# Patient Record
Sex: FEMALE | Race: WHITE | Employment: UNEMPLOYED | URBAN - METROPOLITAN AREA
[De-identification: names, ages, dates, MRNs, and addresses within clinical notes are randomized per-mention and may not be internally consistent; named-entity substitution may affect disease eponyms.]

---

## 2020-10-01 ENCOUNTER — TRANSCRIBE ORDER (OUTPATIENT)
Dept: SCHEDULING | Age: 35
End: 2020-10-01

## 2020-10-01 DIAGNOSIS — M25.552 LEFT HIP PAIN: Primary | ICD-10-CM

## 2020-10-09 ENCOUNTER — HOSPITAL ENCOUNTER (OUTPATIENT)
Dept: MRI IMAGING | Age: 35
Discharge: HOME | End: 2020-10-09
Attending: ORTHOPAEDIC SURGERY
Payer: OTHER GOVERNMENT

## 2020-10-09 ENCOUNTER — HOSPITAL ENCOUNTER (OUTPATIENT)
Dept: GENERAL RADIOLOGY | Age: 35
Discharge: HOME | End: 2020-10-09
Attending: ORTHOPAEDIC SURGERY
Payer: OTHER GOVERNMENT

## 2020-10-09 DIAGNOSIS — M25.552 LEFT HIP PAIN: ICD-10-CM

## 2020-10-09 PROCEDURE — 74011000636 HC RX REV CODE- 636: Performed by: RADIOLOGY

## 2020-10-09 PROCEDURE — A9575 INJ GADOTERATE MEGLUMI 0.1ML: HCPCS | Performed by: RADIOLOGY

## 2020-10-09 PROCEDURE — 27093 INJECTION FOR HIP X-RAY: CPT

## 2020-10-09 PROCEDURE — 2709999900 HC NON-CHARGEABLE SUPPLY

## 2020-10-09 PROCEDURE — 73722 MRI JOINT OF LWR EXTR W/DYE: CPT

## 2020-10-09 PROCEDURE — 74011250636 HC RX REV CODE- 250/636: Performed by: RADIOLOGY

## 2020-10-09 PROCEDURE — 74011000250 HC RX REV CODE- 250

## 2020-10-09 RX ORDER — LIDOCAINE HYDROCHLORIDE 10 MG/ML
10 INJECTION, SOLUTION EPIDURAL; INFILTRATION; INTRACAUDAL; PERINEURAL
Status: DISCONTINUED | OUTPATIENT
Start: 2020-10-09 | End: 2020-10-10 | Stop reason: HOSPADM

## 2020-10-09 RX ORDER — GADOTERATE MEGLUMINE 376.9 MG/ML
1 INJECTION INTRAVENOUS
Status: COMPLETED | OUTPATIENT
Start: 2020-10-09 | End: 2020-10-09

## 2020-10-09 RX ORDER — SODIUM BICARBONATE 42 MG/ML
INJECTION, SOLUTION INTRAVENOUS
Status: COMPLETED
Start: 2020-10-09 | End: 2020-10-09

## 2020-10-09 RX ORDER — SODIUM BICARBONATE 42 MG/ML
1 INJECTION, SOLUTION INTRAVENOUS
Status: DISCONTINUED | OUTPATIENT
Start: 2020-10-09 | End: 2020-10-10 | Stop reason: HOSPADM

## 2020-10-09 RX ORDER — LIDOCAINE HYDROCHLORIDE 10 MG/ML
INJECTION, SOLUTION EPIDURAL; INFILTRATION; INTRACAUDAL; PERINEURAL
Status: COMPLETED
Start: 2020-10-09 | End: 2020-10-09

## 2020-10-09 RX ADMIN — LIDOCAINE HYDROCHLORIDE 5 ML: 10 INJECTION, SOLUTION EPIDURAL; INFILTRATION; INTRACAUDAL; PERINEURAL at 14:38

## 2020-10-09 RX ADMIN — IOHEXOL 10 ML: 300 INJECTION, SOLUTION INTRAVENOUS at 14:38

## 2020-10-09 RX ADMIN — GADOTERATE MEGLUMINE 10 ML: 376.9 INJECTION INTRAVENOUS at 14:37

## 2020-10-09 RX ADMIN — SODIUM BICARBONATE 210 MG: 42 INJECTION, SOLUTION INTRAVENOUS at 14:38

## 2022-07-26 ENCOUNTER — APPOINTMENT (OUTPATIENT)
Dept: GENERAL RADIOLOGY | Age: 37
End: 2022-07-26
Attending: EMERGENCY MEDICINE
Payer: OTHER GOVERNMENT

## 2022-07-26 ENCOUNTER — HOSPITAL ENCOUNTER (EMERGENCY)
Age: 37
Discharge: HOME | End: 2022-07-27
Attending: EMERGENCY MEDICINE
Payer: OTHER GOVERNMENT

## 2022-07-26 VITALS
OXYGEN SATURATION: 97 % | HEIGHT: 65 IN | BODY MASS INDEX: 28.32 KG/M2 | WEIGHT: 170 LBS | HEART RATE: 98 BPM | TEMPERATURE: 98.4 F | RESPIRATION RATE: 20 BRPM | DIASTOLIC BLOOD PRESSURE: 121 MMHG | SYSTOLIC BLOOD PRESSURE: 170 MMHG

## 2022-07-26 DIAGNOSIS — S93.601A RIGHT FOOT SPRAIN, INITIAL ENCOUNTER: Primary | ICD-10-CM

## 2022-07-26 PROCEDURE — 74011250637 HC RX REV CODE- 250/637: Performed by: EMERGENCY MEDICINE

## 2022-07-26 PROCEDURE — 99283 EMERGENCY DEPT VISIT LOW MDM: CPT

## 2022-07-26 PROCEDURE — 73630 X-RAY EXAM OF FOOT: CPT

## 2022-07-26 RX ORDER — ACETAMINOPHEN 325 MG/1
650 TABLET ORAL
Status: COMPLETED | OUTPATIENT
Start: 2022-07-26 | End: 2022-07-26

## 2022-07-26 RX ORDER — IBUPROFEN 800 MG/1
800 TABLET ORAL
COMMUNITY
Start: 2021-08-17 | End: 2023-01-18

## 2022-07-26 RX ORDER — HYDROGEN PEROXIDE 3 %
SOLUTION, NON-ORAL MISCELLANEOUS DAILY
COMMUNITY

## 2022-07-26 RX ORDER — CYCLOBENZAPRINE HCL 10 MG
10 TABLET ORAL
COMMUNITY
Start: 2021-09-14 | End: 2023-05-24

## 2022-07-26 RX ADMIN — ACETAMINOPHEN 650 MG: 325 TABLET ORAL at 23:15

## 2022-07-26 NOTE — Clinical Note
[address]  [facility] EMERGENCY DEPT  [address] [id number] [phone]    Work/School Note    Date: 7/26/2022    To Whom It May concern:    Hilton Pettit was seen and treated today in the emergency room by the following provider(s):  Attending Provider: Chance Tuttle MD.      Hilton Pettit is excused from work/school on 07/26/22 and 07/27/22. She is medically clear to return to work/school on 7/28/2022.        Sincerely,          Miguel Swann MD

## 2022-07-27 NOTE — ED PROVIDER NOTES
68-year-old female presents from home with pain and swelling to her right foot. Symptoms started yesterday. She thinks she may have a twisted it turned funny while at work during the day. She noticed pain and swelling in the later on in the evening. She took over-the-counter pain medication with some improvement. Pain worsened with movement and ambulation. Denies any previous injury or surgery to the foot. No other complaints at this time       History reviewed. No pertinent past medical history. History reviewed. No pertinent surgical history. History reviewed. No pertinent family history. Social History     Socioeconomic History    Marital status:      Spouse name: Not on file    Number of children: Not on file    Years of education: Not on file    Highest education level: Not on file   Occupational History    Not on file   Tobacco Use    Smoking status: Every Day     Packs/day: 0.50     Types: Cigarettes    Smokeless tobacco: Never   Substance and Sexual Activity    Alcohol use: Not Currently    Drug use: Not Currently    Sexual activity: Not on file   Other Topics Concern    Not on file   Social History Narrative    Not on file     Social Determinants of Health     Financial Resource Strain: Not on file   Food Insecurity: Not on file   Transportation Needs: Not on file   Physical Activity: Not on file   Stress: Not on file   Social Connections: Not on file   Intimate Partner Violence: Not on file   Housing Stability: Not on file         ALLERGIES: Latex    Review of Systems   Constitutional:  Negative for fever. HENT:  Negative for facial swelling. Eyes:  Negative for visual disturbance. Respiratory:  Negative for chest tightness. Cardiovascular:  Negative for chest pain. Gastrointestinal:  Negative for abdominal pain. Genitourinary:  Negative for difficulty urinating and dysuria. Musculoskeletal:  Negative for arthralgias. Skin:  Negative for rash.    Neurological: Negative for headaches. Hematological:  Negative for adenopathy. Psychiatric/Behavioral:  Negative for suicidal ideas. Vitals:    07/26/22 2242   BP: (!) 170/121   Pulse: 98   Resp: 20   Temp: 98.4 °F (36.9 °C)   SpO2: 97%   Weight: 77.1 kg (170 lb)   Height: 5' 5\" (1.651 m)            Physical Exam  Vitals and nursing note reviewed. Constitutional:       General: She is not in acute distress. Appearance: She is well-developed. HENT:      Head: Normocephalic and atraumatic. Eyes:      General: No scleral icterus. Conjunctiva/sclera: Conjunctivae normal.      Pupils: Pupils are equal, round, and reactive to light. Cardiovascular:      Rate and Rhythm: Normal rate. Heart sounds: No murmur heard. Pulmonary:      Effort: Pulmonary effort is normal. No respiratory distress. Abdominal:      General: There is no distension. Musculoskeletal:         General: Normal range of motion. Cervical back: Normal range of motion and neck supple. Comments: Mild swelling to the right foot with range of motion limited by pain. Skin:     General: Skin is warm and dry. Findings: No rash. Neurological:      Mental Status: She is alert and oriented to person, place, and time. MDM  Number of Diagnoses or Management Options  Right foot sprain, initial encounter  Diagnosis management comments: Assessment: X-ray unremarkable. Symptoms consistent with a sprain. Ace wrap applied and patient neurovascularly intact. Stable for discharge home to treat symptomatically with rest, ice, elevation. .       Amount and/or Complexity of Data Reviewed  Tests in the radiology section of CPT®: reviewed           Procedures

## 2022-07-27 NOTE — ED TRIAGE NOTES
Patient to ED for R foot pain that started this morning, patient reports yesterday she \"stepped on it weird. \" Swelling noted to R foot. Patient states hurts to ambulate on R foot, and \"cannot spread toes apart. \" Last ibuprofen dose 2230 tonight. Bed in lowest locked position. Call bell within reach. Assessment complete, patient updated on plan of care. Emergency Department Nursing Plan of Care       The Nursing Plan of Care is developed from the Nursing assessment and Emergency Department Attending provider initial evaluation. The plan of care may be reviewed in the ED Provider note.     The Plan of Care was developed with the following considerations:   Patient / Family readiness to learn indicated by:verbalized understanding  Persons(s) to be included in education: patient  Barriers to Learning/Limitations:No    Signed     Jackelyn Ruelas RN    7/26/2022   10:41 PM

## 2022-08-27 ENCOUNTER — HOSPITAL ENCOUNTER (EMERGENCY)
Age: 37
Discharge: HOME | End: 2022-08-27
Attending: STUDENT IN AN ORGANIZED HEALTH CARE EDUCATION/TRAINING PROGRAM
Payer: OTHER GOVERNMENT

## 2022-08-27 VITALS
OXYGEN SATURATION: 97 % | WEIGHT: 165 LBS | HEART RATE: 78 BPM | SYSTOLIC BLOOD PRESSURE: 170 MMHG | TEMPERATURE: 98.7 F | BODY MASS INDEX: 27.49 KG/M2 | RESPIRATION RATE: 16 BRPM | HEIGHT: 65 IN | DIASTOLIC BLOOD PRESSURE: 100 MMHG

## 2022-08-27 DIAGNOSIS — J02.9 PHARYNGITIS, UNSPECIFIED ETIOLOGY: Primary | ICD-10-CM

## 2022-08-27 LAB
COVID-19 RAPID TEST, COVR: NOT DETECTED
DEPRECATED S PYO AG THROAT QL EIA: NEGATIVE
SOURCE, COVRS: NORMAL

## 2022-08-27 PROCEDURE — 87880 STREP A ASSAY W/OPTIC: CPT

## 2022-08-27 PROCEDURE — 87635 SARS-COV-2 COVID-19 AMP PRB: CPT

## 2022-08-27 PROCEDURE — 74011250636 HC RX REV CODE- 250/636: Performed by: PHYSICIAN ASSISTANT

## 2022-08-27 PROCEDURE — 99283 EMERGENCY DEPT VISIT LOW MDM: CPT

## 2022-08-27 PROCEDURE — 87070 CULTURE OTHR SPECIMN AEROBIC: CPT

## 2022-08-27 RX ORDER — DEXAMETHASONE 4 MG/1
10 TABLET ORAL ONCE
Status: COMPLETED | OUTPATIENT
Start: 2022-08-27 | End: 2022-08-27

## 2022-08-27 RX ORDER — KETOROLAC TROMETHAMINE 30 MG/ML
30 INJECTION, SOLUTION INTRAMUSCULAR; INTRAVENOUS
Status: DISCONTINUED | OUTPATIENT
Start: 2022-08-27 | End: 2022-08-27 | Stop reason: HOSPADM

## 2022-08-27 RX ADMIN — DEXAMETHASONE 10 MG: 4 TABLET ORAL at 12:42

## 2022-08-27 NOTE — ED PROVIDER NOTES
59-year-old female no significant medical history presenting to the ED for sore throat and ear pain. Patient reports that symptoms started yesterday, woke up with left ear pain and left-sided sore throat. Notes that today symptoms have worsened, notes that sore throat is now bilateral, ear pain is still unilateral.  Pain is moderately severe, no modifying factors noted. Felt warm last night, no temperature taken at home. Had Tylenol this morning. Patient denies cough, congestion, GI symptoms. No known sick contacts but does work at [employer]. No other concerns. Past medical history: Denies  Social history: + Smoker. Works at [employer]. The history is provided by the patient. Sore Throat   Associated symptoms include ear pain. Pertinent negatives include no vomiting and no shortness of breath. Ear Pain   Associated symptoms include sore throat. Pertinent negatives include no vomiting. No past medical history on file. No past surgical history on file. No family history on file.     Social History     Socioeconomic History    Marital status:      Spouse name: Not on file    Number of children: Not on file    Years of education: Not on file    Highest education level: Not on file   Occupational History    Not on file   Tobacco Use    Smoking status: Every Day     Packs/day: 0.50     Types: Cigarettes    Smokeless tobacco: Never   Substance and Sexual Activity    Alcohol use: Not Currently    Drug use: Not Currently    Sexual activity: Not on file   Other Topics Concern    Not on file   Social History Narrative    Not on file     Social Determinants of Health     Financial Resource Strain: Not on file   Food Insecurity: Not on file   Transportation Needs: Not on file   Physical Activity: Not on file   Stress: Not on file   Social Connections: Not on file   Intimate Partner Violence: Not on file   Housing Stability: Not on file         ALLERGIES: Latex    Review of Systems Constitutional:  Positive for chills. Negative for fever (no temp taken, + subjective). HENT:  Positive for ear pain and sore throat. Negative for facial swelling. Eyes:  Negative for visual disturbance. Respiratory:  Negative for shortness of breath. Cardiovascular:  Negative for chest pain. Gastrointestinal:  Negative for vomiting. Skin:  Negative for wound. Neurological:  Negative for syncope. All other systems reviewed and are negative. Vitals:    08/27/22 1134   BP: (!) 170/100   Pulse: 78   Resp: 16   Temp: 98.7 °F (37.1 °C)   SpO2: 97%   Weight: 74.8 kg (165 lb)   Height: 5' 5\" (1.651 m)            Physical Exam  Vitals and nursing note reviewed. Constitutional:       General: She is not in acute distress. Appearance: She is well-developed. Comments: Pleasant, well-appearing, no distress   HENT:      Head: Normocephalic and atraumatic. Comments: TMs unremarkable  + Pharyngeal erythema without exudate  Midline uvula  No trismus, stridor, drooling  Shotty cervical lymphadenopathy     Right Ear: External ear normal.      Left Ear: External ear normal.      Mouth/Throat: Tonsils: No tonsillar exudate. Eyes:      General: No scleral icterus. Conjunctiva/sclera: Conjunctivae normal.   Neck:      Trachea: No tracheal deviation. Cardiovascular:      Rate and Rhythm: Normal rate and regular rhythm. Heart sounds: Normal heart sounds. No murmur heard. No friction rub. No gallop. Pulmonary:      Effort: Pulmonary effort is normal. No respiratory distress. Breath sounds: Normal breath sounds. No stridor. No wheezing. Abdominal:      General: There is no distension. Palpations: Abdomen is soft. Musculoskeletal:         General: Normal range of motion. Cervical back: Neck supple. Lymphadenopathy:      Cervical: Cervical adenopathy present. Skin:     General: Skin is warm and dry.    Neurological:      Mental Status: She is alert and oriented to person, place, and time. Psychiatric:         Behavior: Behavior normal.        MDM  Number of Diagnoses or Management Options  Pharyngitis, unspecified etiology  Diagnosis management comments: 71-year-old female presenting to the ED for 2 to 3-day history of ear pain and sore throat. Afebrile. No cough or other respiratory symptoms. Exam with mild pharyngeal erythema without exudate, midline uvula, shoddy cervical lymphadenopathy, no findings on exam or symptoms concerning for deep space infection of the neck. Will treat symptomatically with single dose of Decadron, NSAIDs. Rapid strep and COVID test negative. Explained to patient likely viral illness, recommended supportive care, return precautions given.        Amount and/or Complexity of Data Reviewed  Clinical lab tests: ordered and reviewed  Discuss the patient with other providers: yes (Dr. [facility] ED attending)           Procedures

## 2022-08-27 NOTE — ED TRIAGE NOTES
Pt arrives with the c/c of sore throat and left ear pain that started yesterday and blancas aches, denies any fevers or shortness of breath; denies cough. No known sick contacts.

## 2022-08-27 NOTE — DISCHARGE INSTRUCTIONS
Return for new or worsening symptoms such as severe pain only one side of the throat, difficulty swallowing. Your strep and COVID tests here were negative today. It is likely that your symptoms are caused by another type of viral illness. The steroid that you were given here will help with inflammation and pain in the throat for the next 24 to 48 hours. You may also take ibuprofen, 3 to 4 tablets every 6-8 hours, as needed for pain.   Drink plenty of fluids and get adequate rest.

## 2022-08-27 NOTE — LETTER
[address]  [facility]GENC[facility]PT  [address] [id number] [phone]    Work/School Note    Date: 8/27/2022    To Whom It May concern:    Adriana Hu was seen and treated today in the emergency room by the following provider(s):  Attending Provider: Parrish Gold DO  Physician Assistant: NEO Wilson. Adriana Hu is excused from work/school on 08/27/22 and 08/28/22. She is medically clear to return to work/school on 8/29/2022.        Sincerely,          NEO Wolf

## 2022-08-29 LAB
BACTERIA SPEC CULT: NORMAL
SERVICE CMNT-IMP: NORMAL

## 2023-02-21 ENCOUNTER — HOSPITAL ENCOUNTER (EMERGENCY)
Age: 38
Discharge: HOME | End: 2023-02-21
Attending: EMERGENCY MEDICINE
Payer: OTHER GOVERNMENT

## 2023-02-21 ENCOUNTER — APPOINTMENT (OUTPATIENT)
Dept: CT IMAGING | Age: 38
End: 2023-02-21
Attending: PHYSICIAN ASSISTANT
Payer: OTHER GOVERNMENT

## 2023-02-21 VITALS
BODY MASS INDEX: 29.16 KG/M2 | SYSTOLIC BLOOD PRESSURE: 169 MMHG | RESPIRATION RATE: 16 BRPM | TEMPERATURE: 98.8 F | OXYGEN SATURATION: 98 % | DIASTOLIC BLOOD PRESSURE: 95 MMHG | HEART RATE: 104 BPM | WEIGHT: 175 LBS | HEIGHT: 65 IN

## 2023-02-21 DIAGNOSIS — R03.0 ELEVATED BLOOD PRESSURE READING: ICD-10-CM

## 2023-02-21 DIAGNOSIS — R51.9 ACUTE NONINTRACTABLE HEADACHE, UNSPECIFIED HEADACHE TYPE: Primary | ICD-10-CM

## 2023-02-21 DIAGNOSIS — H53.8 BLURRED VISION: ICD-10-CM

## 2023-02-21 PROCEDURE — 70450 CT HEAD/BRAIN W/O DYE: CPT

## 2023-02-21 PROCEDURE — 96375 TX/PRO/DX INJ NEW DRUG ADDON: CPT

## 2023-02-21 PROCEDURE — 99284 EMERGENCY DEPT VISIT MOD MDM: CPT

## 2023-02-21 PROCEDURE — 96374 THER/PROPH/DIAG INJ IV PUSH: CPT

## 2023-02-21 PROCEDURE — 74011250636 HC RX REV CODE- 250/636: Performed by: PHYSICIAN ASSISTANT

## 2023-02-21 RX ORDER — SITAGLIPTIN 100 MG/1
TABLET, FILM COATED ORAL
COMMUNITY
Start: 2023-02-16

## 2023-02-21 RX ORDER — HYDROCODONE BITARTRATE AND ACETAMINOPHEN 5; 325 MG/1; MG/1
TABLET ORAL
COMMUNITY
Start: 2022-02-20

## 2023-02-21 RX ORDER — FENOFIBRATE 48 MG/1
TABLET, COATED ORAL
COMMUNITY
Start: 2023-02-16

## 2023-02-21 RX ORDER — LISINOPRIL 10 MG/1
TABLET ORAL
COMMUNITY
Start: 2023-02-16

## 2023-02-21 RX ORDER — DIPHENHYDRAMINE HYDROCHLORIDE 50 MG/ML
25 INJECTION, SOLUTION INTRAMUSCULAR; INTRAVENOUS
Status: COMPLETED | OUTPATIENT
Start: 2023-02-21 | End: 2023-02-21

## 2023-02-21 RX ORDER — PROCHLORPERAZINE EDISYLATE 5 MG/ML
10 INJECTION INTRAMUSCULAR; INTRAVENOUS
Status: COMPLETED | OUTPATIENT
Start: 2023-02-21 | End: 2023-02-21

## 2023-02-21 RX ADMIN — DIPHENHYDRAMINE HYDROCHLORIDE 25 MG: 50 INJECTION, SOLUTION INTRAMUSCULAR; INTRAVENOUS at 16:17

## 2023-02-21 RX ADMIN — SODIUM CHLORIDE 1000 ML: 9 INJECTION, SOLUTION INTRAVENOUS at 15:59

## 2023-02-21 RX ADMIN — PROCHLORPERAZINE EDISYLATE 10 MG: 5 INJECTION INTRAMUSCULAR; INTRAVENOUS at 16:16

## 2023-02-21 NOTE — Clinical Note
[address]  [facility] EMERGENC[facility] DEPT  [address] [id number] [phone]    Work/School Note    Date: 2/21/2023    To Whom It May concern:    Val Diana was seen and treated today in the emergency room by the following provider(s):  Attending Provider: Martin Vela MD  Physician Assistant: Florence Rodas PA-C. Val Diana is excused from work/school on 02/21/23 and 02/22/23. She is medically clear to return to work/school on 2/23/2023.        Sincerely,          Olivia Barron PA-C

## 2023-02-21 NOTE — ED PROVIDER NOTES
40-year-old female with PMH significant for anxiety, gestational hypertension and gestational diabetes with recent diagnosis of primary hypertension and diabetes who presents ambulatory with  for evaluation of headache, blurred vision since this morning. She states waking up this morning with a dull frontal headache, and states around 9 AM while at work at [employer] she had a slow increase of frontal headache and blurred vision in both eyes. She took Tylenol 1g at 11:40 AM and ibuprofen 800 mg at 2 PM with continuation of symptoms. EMS was called. She was recently started on lisinopril 10 mg, Sitagliptin 100 mg, fenofibrate 48 mg by her PCP at the [facility] [facility]. Took her meds today. No brain imaging in the past. She denies F/C, N/V/D, current CP, SOB, numbness/tingling, weakness. Minimal blurred vision currently. Denies hx of migraines. EMS checked glucose, 216. No vomiting, N/C/D, diplopia, speech change, ataxia. Past Medical History:   Diagnosis Date    Hypertension        No past surgical history on file. No family history on file.     Social History     Socioeconomic History    Marital status:      Spouse name: Not on file    Number of children: Not on file    Years of education: Not on file    Highest education level: Not on file   Occupational History    Not on file   Tobacco Use    Smoking status: Every Day     Packs/day: 0.50     Types: Cigarettes    Smokeless tobacco: Never   Substance and Sexual Activity    Alcohol use: Not Currently    Drug use: Not Currently    Sexual activity: Not on file   Other Topics Concern    Not on file   Social History Narrative    Not on file     Social Determinants of Health     Financial Resource Strain: Not on file   Food Insecurity: Not on file   Transportation Needs: Not on file   Physical Activity: Not on file   Stress: Not on file   Social Connections: Not on file   Intimate Partner Violence: Not on file   Housing Stability: Not on file ALLERGIES: Latex, Codeine, Dilaudid cough, and Toradol [ketorolac]    Review of Systems   Constitutional: Negative. Negative for activity change, chills, fatigue and unexpected weight change. HENT:  Negative for congestion, ear pain, facial swelling and trouble swallowing. Eyes:  Positive for visual disturbance. Respiratory:  Negative for cough, chest tightness, shortness of breath and wheezing. Cardiovascular: Negative. Negative for chest pain and palpitations. Gastrointestinal: Negative. Negative for abdominal pain, diarrhea, nausea and vomiting. Genitourinary: Negative. Negative for dysuria, flank pain, frequency and hematuria. Musculoskeletal: Negative. Negative for arthralgias, back pain, neck pain and neck stiffness. Skin: Negative. Negative for color change and rash. Neurological:  Positive for headaches. Negative for dizziness and numbness. All other systems reviewed and are negative. Vitals:    02/21/23 1508 02/21/23 1514 02/21/23 1530   BP: (!) 190/110 (!) 187/135 (!) 170/100   Pulse: (!) 104     Resp: 16     Temp: 98.7 °F (37.1 °C)     SpO2: 100% 97% 96%   Weight: 79.4 kg (175 lb)     Height: 5' 5\" (1.651 m)              Physical Exam  Vitals and nursing note reviewed. Constitutional:       General: She is not in acute distress. Appearance: Normal appearance. She is well-developed. She is not toxic-appearing or diaphoretic. Comments: WF   HENT:      Head: Normocephalic and atraumatic. Right Ear: Tympanic membrane normal.      Left Ear: Tympanic membrane normal.      Nose: Nose normal.      Mouth/Throat:      Mouth: Mucous membranes are moist.   Eyes:      General:         Right eye: No discharge. Left eye: No discharge. Extraocular Movements: Extraocular movements intact. Conjunctiva/sclera: Conjunctivae normal.      Pupils: Pupils are equal, round, and reactive to light. Neck:      Trachea: No tracheal tenderness. Cardiovascular:      Rate and Rhythm: Normal rate and regular rhythm. Pulses: Normal pulses. Heart sounds: Normal heart sounds. No murmur heard. No friction rub. No gallop. Pulmonary:      Effort: Pulmonary effort is normal. No respiratory distress. Breath sounds: Normal breath sounds. No wheezing or rales. Chest:      Chest wall: No tenderness. Abdominal:      General: Bowel sounds are normal. There is no distension. Palpations: Abdomen is soft. Tenderness: There is no abdominal tenderness. There is no guarding or rebound. Musculoskeletal:         General: No tenderness. Normal range of motion. Cervical back: Full passive range of motion without pain and normal range of motion. Skin:     General: Skin is warm and dry. Capillary Refill: Capillary refill takes less than 2 seconds. Findings: No abrasion, erythema or rash. Neurological:      General: No focal deficit present. Mental Status: She is alert and oriented to person, place, and time. Cranial Nerves: No cranial nerve deficit. Sensory: No sensory deficit. Coordination: Coordination normal.      Comments: No facial droop. Speech normal, not slurred. Smile equal. EOM intact. No nystagmus. Neg Pronator drift. Fingertip-nose-fingertip intact. Strength 5/5 in upper and lower extremities. NVI throughout. Gait normal and unassisted. No ataxia. Psychiatric:         Speech: Speech normal.         Behavior: Behavior normal.        Medical Decision Making    Ddx: migraine headache, HTN    Amount and/or Complexity of Data Reviewed  Radiology: ordered. Risk  Prescription drug management. Procedures    I discussed patient's PMH, exam findings as well as careplan with the ER attending who agrees with care plan. Sabrina Moran PA-C    5:35pm- Patient has been reassessed and feeling much better. She is pain-free. Headache has completely resolved.   CT unremarkable for acute findings. Discussed close follow-up with PCP, continue blood pressure and other medications as prescribed. Encouraged eye specialist eval also. DISCHARGE NOTE:  5:40 PM  The patient has been re-evaluated and feeling much better and are stable for discharge. All available radiology and laboratory results have been reviewed with patient and/or available family. Patient and/or family verbally conveyed their understanding and agreement of the patient's signs, symptoms, diagnosis, treatment and prognosis and additionally agree to follow-up as recommended in the discharge instructions or to return to the Emergency Department should their condition change or worsen prior to their follow-up appointment. All questions have been answered and patient and/or available family express understanding. LABORATORY RESULTS:  No results found for this or any previous visit (from the past 24 hour(s)). IMAGING RESULTS:  CT HEAD WO CONT    Result Date: 2/21/2023  No acute intracranial process seen      MEDICATIONS GIVEN:  Medications   prochlorperazine (COMPAZINE) injection 10 mg (10 mg IntraVENous Given 2/21/23 1616)   diphenhydrAMINE (BENADRYL) injection 25 mg (25 mg IntraVENous Given 2/21/23 1617)   sodium chloride 0.9 % bolus infusion 1,000 mL (0 mL IntraVENous IV Completed 2/21/23 1730)       IMPRESSION:  1. Acute nonintractable headache, unspecified headache type    2. Elevated blood pressure reading    3. Blurred vision        PLAN:  Follow-up Information       Follow up With Specialties Details Why Contact Info    Follow-up with your PCP this week. Schedule an appointment as soon as possible for a visit       [facility]  Schedule an appointment as soon as possible for a visit  or eye doctor recommended by the base for follow-up.  [address] [place] [address] Drive [address] [phone]          Current Discharge Medication List

## 2023-02-21 NOTE — ED TRIAGE NOTES
Pt arrived per EMS c/o HTN, and HA onset 0900 this am. Started having blurred vision in both eyes PTA. Pt recently started on   BP meds a week ago.
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
## 2023-02-21 NOTE — Clinical Note
[address]  [facility]ERGENCY [facility]PT  [address] [id number] [phone]    Work/School Note    Date: 2/21/2023    To Whom It May concern:    Heri Bardales was seen and treated today in the emergency room by the following provider(s):  Attending Provider: Glee Phalen, MD  Physician Assistant: Jose Juan Glover PA-C. Heri Bardales is excused from work/school on 02/21/23 and 02/22/23. She is medically clear to return to work/school on 2/23/2023.        Sincerely,          Adrienne Harrington PA-C

## 2023-11-13 ENCOUNTER — APPOINTMENT (OUTPATIENT)
Facility: HOSPITAL | Age: 38
End: 2023-11-13
Payer: OTHER GOVERNMENT

## 2023-11-13 ENCOUNTER — HOSPITAL ENCOUNTER (EMERGENCY)
Facility: HOSPITAL | Age: 38
Discharge: HOME | End: 2023-11-13
Attending: EMERGENCY MEDICINE
Payer: OTHER GOVERNMENT

## 2023-11-13 VITALS
OXYGEN SATURATION: 97 % | DIASTOLIC BLOOD PRESSURE: 99 MMHG | SYSTOLIC BLOOD PRESSURE: 162 MMHG | TEMPERATURE: 98.6 F | WEIGHT: 167 LBS | BODY MASS INDEX: 27.82 KG/M2 | HEART RATE: 110 BPM | HEIGHT: 65 IN | RESPIRATION RATE: 18 BRPM

## 2023-11-13 DIAGNOSIS — M54.50 ACUTE BILATERAL LOW BACK PAIN WITHOUT SCIATICA: ICD-10-CM
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
## 2023-11-13 DIAGNOSIS — S16.1XXA ACUTE STRAIN OF NECK MUSCLE, INITIAL ENCOUNTER: ICD-10-CM

## 2023-11-13 PROCEDURE — 99284 EMERGENCY DEPT VISIT MOD MDM: CPT

## 2023-11-13 PROCEDURE — 72125 CT NECK SPINE W/O DYE: CPT

## 2023-11-13 PROCEDURE — 72131 CT LUMBAR SPINE W/O DYE: CPT

## 2023-11-13 RX ORDER — NAPROXEN 500 MG/1
500 TABLET ORAL EVERY 12 HOURS PRN
Qty: 20 TABLET | Refills: 0 | Status: SHIPPED | OUTPATIENT
Start: 2023-11-13

## 2023-11-13 RX ORDER — CYCLOBENZAPRINE HCL 5 MG
5 TABLET ORAL 2 TIMES DAILY PRN
Qty: 20 TABLET | Refills: 0 | Status: SHIPPED | OUTPATIENT
Start: 2023-11-13 | End: 2023-11-23

## 2023-11-13 ASSESSMENT — PAIN DESCRIPTION - LOCATION: LOCATION: NECK

## 2023-11-13 ASSESSMENT — PAIN - FUNCTIONAL ASSESSMENT: PAIN_FUNCTIONAL_ASSESSMENT: 0-10

## 2023-11-13 ASSESSMENT — PAIN DESCRIPTION - DESCRIPTORS: DESCRIPTORS: ACHING

## 2023-11-13 ASSESSMENT — PAIN SCALES - GENERAL: PAINLEVEL_OUTOF10: 9
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
## 2023-11-13 NOTE — ED NOTES
Pt was discharged and given instructions by MD. Pt verbalized good understanding of all discharge instructions,2 prescriptions and F/U care. All questions answered. Pt in stable condition on discharge.        Pedrito Valencia RN  11/13/23 [id number]
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
## 2024-10-07 ENCOUNTER — APPOINTMENT (OUTPATIENT)
Facility: HOSPITAL | Age: 39
End: 2024-10-07
Payer: OTHER GOVERNMENT

## 2024-10-07 ENCOUNTER — HOSPITAL ENCOUNTER (EMERGENCY)
Facility: HOSPITAL | Age: 39
Discharge: HOME | End: 2024-10-07
Attending: EMERGENCY MEDICINE
Payer: OTHER GOVERNMENT

## 2024-10-07 VITALS
SYSTOLIC BLOOD PRESSURE: 158 MMHG | OXYGEN SATURATION: 100 % | WEIGHT: 144 LBS | DIASTOLIC BLOOD PRESSURE: 103 MMHG | HEIGHT: 65 IN | HEART RATE: 91 BPM | BODY MASS INDEX: 23.99 KG/M2 | TEMPERATURE: 97.9 F | RESPIRATION RATE: 20 BRPM

## 2024-10-07 DIAGNOSIS — M25.561 ACUTE PAIN OF RIGHT KNEE: Primary | ICD-10-CM

## 2024-10-07 PROCEDURE — 99283 EMERGENCY DEPT VISIT LOW MDM: CPT

## 2024-10-07 PROCEDURE — 73562 X-RAY EXAM OF KNEE 3: CPT

## 2024-10-07 ASSESSMENT — ENCOUNTER SYMPTOMS
BACK PAIN: 0
NAUSEA: 0
ABDOMINAL PAIN: 0
DIARRHEA: 0
SHORTNESS OF BREATH: 0
VOMITING: 0
COLOR CHANGE: 0
CONSTIPATION: 0

## 2024-10-07 ASSESSMENT — PAIN DESCRIPTION - ONSET: ONSET: ON-GOING

## 2024-10-07 ASSESSMENT — PAIN DESCRIPTION - PAIN TYPE: TYPE: ACUTE PAIN

## 2024-10-07 ASSESSMENT — LIFESTYLE VARIABLES
HOW MANY STANDARD DRINKS CONTAINING ALCOHOL DO YOU HAVE ON A TYPICAL DAY: PATIENT DOES NOT DRINK
HOW OFTEN DO YOU HAVE A DRINK CONTAINING ALCOHOL: NEVER

## 2024-10-07 ASSESSMENT — PAIN DESCRIPTION - FREQUENCY: FREQUENCY: CONTINUOUS

## 2024-10-07 ASSESSMENT — PAIN DESCRIPTION - LOCATION: LOCATION: KNEE

## 2024-10-07 ASSESSMENT — PAIN - FUNCTIONAL ASSESSMENT: PAIN_FUNCTIONAL_ASSESSMENT: 0-10

## 2024-10-07 ASSESSMENT — PAIN DESCRIPTION - ORIENTATION: ORIENTATION: RIGHT

## 2024-10-07 ASSESSMENT — PAIN DESCRIPTION - DESCRIPTORS: DESCRIPTORS: ACHING;THROBBING

## 2024-10-07 NOTE — ED TRIAGE NOTES
Pt arrived via pov with sig other, states r-knee pain since Friday/Saturday, states 6-7/10 aching/throbbing pain, last took motrin 800mg at 2149 last night and tylenol 1300mg 1610 yesterday, denies any injury. Pt ambulatory w/out assistance to triage

## 2024-10-07 NOTE — ED PROVIDER NOTES
orthopedics.  Return precautions given.        REASSESSMENT              CONSULTS:  None    PROCEDURES:  Unless otherwise noted below, none     Procedures        FINAL IMPRESSION      1. Acute pain of right knee          DISPOSITION/PLAN   DISPOSITION Decision To Discharge 10/07/2024 12:54:19 AM      PATIENT REFERRED TO:  your Primary Care Physician    Schedule an appointment as soon as possible for a visit       [facility] [place] [place]  [address]  [facility] [address] [phone]    As needed    [facility]  [address] [facility] [place] [address] [phone]  Schedule an appointment as soon as possible for a visit         DISCHARGE MEDICATIONS:  New Prescriptions    No medications on file     Controlled Substances Monitoring:          No data to display                (Please note that portions of this note were completed with a voice recognition program.  Efforts were made to edit the dictations but occasionally words are mis-transcribed.)    Jarvis Servin MD (electronically signed)  Attending Emergency Physician           Jarvis Servin MD  10/07/24 0057